# Patient Record
Sex: MALE | Race: WHITE | URBAN - METROPOLITAN AREA
[De-identification: names, ages, dates, MRNs, and addresses within clinical notes are randomized per-mention and may not be internally consistent; named-entity substitution may affect disease eponyms.]

---

## 2018-01-29 ENCOUNTER — HISTORICAL (OUTPATIENT)
Dept: RADIOLOGY | Facility: HOSPITAL | Age: 68
End: 2018-01-29

## 2018-12-06 ENCOUNTER — HOSPITAL ENCOUNTER (OUTPATIENT)
Dept: EMERGENCY MEDICINE | Facility: HOSPITAL | Age: 68
End: 2018-12-07
Attending: INTERNAL MEDICINE | Admitting: INTERNAL MEDICINE

## 2018-12-06 LAB
ABS NEUT (OLG): 9.59 X10(3)/MCL (ref 2.1–9.2)
ALBUMIN SERPL-MCNC: 2.3 GM/DL (ref 3.4–5)
ALBUMIN/GLOB SERPL: 0 RATIO (ref 1–2)
ALP SERPL-CCNC: 312 UNIT/L (ref 45–117)
ALT SERPL-CCNC: 61 UNIT/L (ref 12–78)
APTT PPP: 47.2 SECOND(S) (ref 23.3–37)
AST SERPL-CCNC: 139 UNIT/L (ref 15–37)
BASOPHILS # BLD AUTO: 0.03 X10(3)/MCL
BASOPHILS NFR BLD AUTO: 0 %
BILIRUB SERPL-MCNC: 0.8 MG/DL (ref 0.2–1)
BILIRUBIN DIRECT+TOT PNL SERPL-MCNC: 0.4 MG/DL
BILIRUBIN DIRECT+TOT PNL SERPL-MCNC: 0.4 MG/DL
BUN SERPL-MCNC: 30 MG/DL (ref 7–18)
CALCIUM SERPL-MCNC: 7.9 MG/DL (ref 8.5–10.1)
CHLORIDE SERPL-SCNC: 100 MMOL/L (ref 98–107)
CK MB SERPL-MCNC: 5.5 NG/ML (ref 1–3.6)
CK SERPL-CCNC: 2534 UNIT/L (ref 39–308)
CO2 SERPL-SCNC: 27 MMOL/L (ref 21–32)
CREAT SERPL-MCNC: 1 MG/DL (ref 0.6–1.3)
CROSSMATCH INTERPRETATION: NORMAL
EOSINOPHIL # BLD AUTO: 0.04 X10(3)/MCL
EOSINOPHIL NFR BLD AUTO: 0 %
ERYTHROCYTE [DISTWIDTH] IN BLOOD BY AUTOMATED COUNT: 14.6 % (ref 11.5–14.5)
ERYTHROCYTE [DISTWIDTH] IN BLOOD BY AUTOMATED COUNT: 14.6 % (ref 11.5–14.5)
GLOBULIN SER-MCNC: 4.8 GM/ML (ref 2.3–3.5)
GLUCOSE SERPL-MCNC: 199 MG/DL (ref 74–106)
HCT VFR BLD AUTO: 22.4 % (ref 40–51)
HCT VFR BLD AUTO: 23.6 % (ref 40–51)
HGB BLD-MCNC: 7.3 GM/DL (ref 13.5–17.5)
HGB BLD-MCNC: 7.7 GM/DL (ref 13.5–17.5)
IMM GRANULOCYTES # BLD AUTO: 0.07 10*3/UL
IMM GRANULOCYTES NFR BLD AUTO: 1 %
INR PPP: 1.84 (ref 0.9–1.2)
LACTATE SERPL-SCNC: 2.4 MMOL/L (ref 0.4–2)
LACTATE SERPL-SCNC: 4 MMOL/L (ref 0.4–2)
LYMPHOCYTES # BLD AUTO: 0.78 X10(3)/MCL
LYMPHOCYTES NFR BLD AUTO: 7 % (ref 13–40)
MAGNESIUM SERPL-MCNC: 1.9 MG/DL (ref 1.8–2.4)
MCH RBC QN AUTO: 31.9 PG (ref 26–34)
MCH RBC QN AUTO: 32.2 PG (ref 26–34)
MCHC RBC AUTO-ENTMCNC: 32.6 GM/DL (ref 31–37)
MCHC RBC AUTO-ENTMCNC: 32.6 GM/DL (ref 31–37)
MCV RBC AUTO: 97.8 FL (ref 80–100)
MCV RBC AUTO: 98.7 FL (ref 80–100)
MONOCYTES # BLD AUTO: 0.58 X10(3)/MCL
MONOCYTES NFR BLD AUTO: 5 % (ref 4–12)
NEUTROPHILS # BLD AUTO: 9.59 X10(3)/MCL
NEUTROPHILS NFR BLD AUTO: 86 X10(3)/MCL
PLATELET # BLD AUTO: 252 X10(3)/MCL (ref 130–400)
PLATELET # BLD AUTO: 254 X10(3)/MCL (ref 130–400)
PMV BLD AUTO: 9.7 FL (ref 7.4–10.4)
PMV BLD AUTO: 9.9 FL (ref 7.4–10.4)
POTASSIUM SERPL-SCNC: 4.1 MMOL/L (ref 3.5–5.1)
PRODUCT READY: NORMAL
PROT SERPL-MCNC: 7.1 GM/DL (ref 6.4–8.2)
PROTHROMBIN TIME: 20.2 SECOND(S) (ref 11.9–14.4)
RBC # BLD AUTO: 2.29 X10(6)/MCL (ref 4.5–5.9)
RBC # BLD AUTO: 2.39 X10(6)/MCL (ref 4.5–5.9)
SODIUM SERPL-SCNC: 134 MMOL/L (ref 136–145)
TRANSFUSION ORDER: NORMAL
TROPONIN I SERPL-MCNC: 0.03 NG/ML (ref 0–0.05)
WBC # SPEC AUTO: 11.1 X10(3)/MCL (ref 4.5–11)
WBC # SPEC AUTO: 13.6 X10(3)/MCL (ref 4.5–11)

## 2018-12-07 LAB
AMPHET UR QL SCN: NEGATIVE
APPEARANCE, UA: CLEAR
BACTERIA #/AREA URNS AUTO: ABNORMAL /[HPF]
BARBITURATE SCN PRESENT UR: NEGATIVE
BENZODIAZ UR QL SCN: NEGATIVE
BILIRUB UR QL STRIP: NEGATIVE
CANNABINOIDS UR QL SCN: POSITIVE
COCAINE UR QL SCN: NEGATIVE
COLOR UR: YELLOW
GLUCOSE (UA): NORMAL
HGB UR QL STRIP: NEGATIVE
HYALINE CASTS #/AREA URNS LPF: ABNORMAL /[LPF]
KETONES UR QL STRIP: ABNORMAL
LEUKOCYTE ESTERASE UR QL STRIP: NEGATIVE
NITRITE UR QL STRIP: NEGATIVE
OPIATES UR QL SCN: POSITIVE
PCP UR QL: NEGATIVE
PH UR STRIP.AUTO: 5.5 [PH] (ref 5–8)
PH UR STRIP: 5.5 [PH] (ref 4.5–8)
PROT UR QL STRIP: 30 MG/DL
RBC #/AREA URNS AUTO: ABNORMAL /[HPF]
SP GR UR STRIP: 1.02 (ref 1–1.03)
SQUAMOUS #/AREA URNS LPF: ABNORMAL /[LPF]
TEMPERATURE, URINE (OHS): 25 DEGC (ref 20–25)
UROBILINOGEN UR STRIP-ACNC: 2 MG/DL
WBC #/AREA URNS AUTO: ABNORMAL /HPF

## 2019-07-24 ENCOUNTER — HISTORICAL (OUTPATIENT)
Dept: ADMINISTRATIVE | Facility: HOSPITAL | Age: 69
End: 2019-07-24

## 2019-07-24 LAB
ALBUMIN SERPL-MCNC: 1.8 GM/DL (ref 3.4–5)
ALP SERPL-CCNC: 254 UNIT/L (ref 45–117)
ALT SERPL-CCNC: 28 UNIT/L (ref 16–61)
AST SERPL-CCNC: 50 UNIT/L (ref 15–37)
BILIRUB SERPL-MCNC: 0.5 MG/DL (ref 0.2–1)
BILIRUBIN DIRECT+TOT PNL SERPL-MCNC: 0.22 MG/DL (ref 0–0.2)
BILIRUBIN DIRECT+TOT PNL SERPL-MCNC: 0.28 MG/DL (ref 0–1)
PROT SERPL-MCNC: 6.2 GM/DL (ref 6.4–8.2)

## 2022-04-11 ENCOUNTER — HISTORICAL (OUTPATIENT)
Dept: ADMINISTRATIVE | Facility: HOSPITAL | Age: 72
End: 2022-04-11

## 2022-04-27 VITALS
WEIGHT: 163.81 LBS | DIASTOLIC BLOOD PRESSURE: 79 MMHG | HEIGHT: 74 IN | SYSTOLIC BLOOD PRESSURE: 133 MMHG | BODY MASS INDEX: 21.02 KG/M2

## 2022-04-30 NOTE — CONSULTS
"   Patient:   Saul Calzada            MRN: [id number]            FIN: [id number]               Age:   68 years     Sex:  Male     :  1950   Associated Diagnoses:   None   Author:   Renata Case MD      DATE: 2018    [facility] Internal Medicine Consult Note    History of Present Illness:  Mr. Calzada is a 60-year-old  man with PMH significant for HTN, paroxysmal AFib, CAD s/p CABG ×2 and aortic valve replacement with porcine valve on 2018, and PVD s/p femoral bypass surgery of the left leg on 2018 who presented to the [facility] ED complaining of worsening swollen left leg with severe pain and nausea for 1 week.  The patient had CABG ×2 with aortic valve replacement with a porcine bioprosthetic valve on 2018 in [facility].  Then, on 2018, patient proceeded to a femoral bypass surgery of his left leg due to severe PVD.  Patient stated that ever since that femoral bypass surgery, leg pain persisted.  The swelling had decreased, but increased significantly this morning along with pain and bruising of serosanguineous drainage.  He describes the pain as "hot coals " on his medial left thigh and knee, the site of the femoral bypass surgery.  He also admits to subjective fevers.  In the ED, the patient complained of severe pain in his left leg even with light touch.  He also described a chest pain over his sternotomy scars that began in the ED.  CMP was significant for glucose 199, , AlkPhos 312.  Lactic acid was 4.0.  CBC showed WBC 13.6 and H/H of 7.3/22.4.  UA was negative for blood, ketones, or signs of inflammation.  Uterus is UDS positive for cannabis and opiates.  Due to his low H/H, he was transfused 2 units of blood.  Venous ultrasound of left lower extremity was negative for thrombus, but noted hypoechoic fluid structures at site of surgical incision interpreted as likely postoperative seroma.  Chest XR, read by me, showed some left pleural " effusion, but otherwise no acute pulmonary processes.  Internal medicine was consulted for further management.  Allergies  NKDA    Home Medications  Amlodipine 5 mg daily  Aspirin 81 mg daily  Carvedilol 3.125 mg BID  Lisinopril 10 mg daily  Bumetanide 1 mg daily  Simvastatin 20 mg daily  Warfarin 5 mg daily    Past Medical History  HTN, paroxysmal AFib, coronary artery disease, peripheral vascular disease    Family Medical History  Noncontributory    Past Surgical History  ORIF of left leg on 10/12/2018  CABG ×2 on 11/16/2018  AVR on 11/16/2018  Left fem-pop on 11/20/2018    Social History  Alcohol use - daily beers  Tobacco use - daily cigarettes  Illicit drug use - marijuana use    Review of Systems  Constitutional: Fevers; No chills; No night sweats; No fatigue  HEENT: No headache; No acute vision changes  Cardiovascular: Chest pain; No trauma  Respiratory: No SOB; No cough; No wheezing  Gastrointestinal: No abdominal pain; No nausea; No vomiting; No diarrhea  Musculoskeletal: No weakness; Pain in the left leg  Neurological: No dizziness; Numbness along left leg  Psychiatric: No depression; No anxiety    Physical Exam  VITAL SIGNS: T 37.6, HR is 80, RR 21, SpO2 95% on room air, /65  GENERAL: Alert and Oriented to person, place, time, and event; Not in acute distress in the beginning, but in moderate acute distress after physical exam  HEENT: PERRLA; Extra-ocular motor grossly intact; Normal conjunctiva  CARDIOVASCULAR: Regular rate and rhythm; No mumurs, rubs, or gallops; Longitudinal midsternal surgical scar  RESPIRATORY: Clear to auscultation bilaterally; No rhonchi, rales, or wheezing  GASTROINTESTINAL: Soft; Not tender; Not distended, not tender; BS+; No guarding  MUSKULOSKELETAL: Normal ROM except as noted; Normal muscular development  EXTREMETIES: Several longitudinal incision scars on his left leg and right leg likely secondary to surgery.  There is a long surgical scar from mid medial thigh down to  just past his left knee that has dried blood along the surgical incisions.  The left leg is swollen with erythema.  Distal pedal pulses were nonpalpable, but detectable by Doppler.  During the exam, patient appears to be exquisite pain even on light touch.  NEURO: CN II-XII grossly intact; No focal neurological deficits  PSYCHIATRIC: Cooperative; Appropriate mood and affect      Assessment & Plan  Postsurgical infection vs. Compartment syndrome vs. Arterial occlusion secondary to graft failure  Hypertension  Paroxysmal AFib  CAD s/p CABG ×2  Aortic stenosis s/p AVR with porcine bioprosthetic valve  PVD s/p fem-pop of left leg    Mr. Calzada is a 68-year-old  man with significant cardiac history and PVD s/p fem-pop on 11/28/2018 who presented to the [facility] ED complaining of worsening left leg pain post surgery with swelling, numbness, and serosanguineous drainage from surgical site.  Patient left leg appears very edematous, erythematous.  There is dried blood along surgical incision lines, but no purulent drainage.  He also has paresthesias of the left leg.  After physical examination, patient appeared to have exquisite pain in the left leg out of proportion of wound appearance.  Surgery was consulted out of concern for post surgical site infection versus compartment syndrome.  Dr. Betancourt from surgery evaluated the patient and expressed concerns for arterial occlusion requiring emergent vascular surgical intervention.  Since the patient had his fem-pop surgery performed at the [facility], she recommended immediate transfer for emergent surgical intervention.  The nurse manager in the ED called the [facility] for transfer.  [facility] past surgery a septic patient.  Surgery arranging for transport, and patient will be transferred.

## 2022-04-30 NOTE — ED PROVIDER NOTES
Patient:   Saul Calzada            MRN: [id number]            FIN: [id number]               Age:   68 years     Sex:  Male     :  1950   Associated Diagnoses:   Lactic acidosis; Anemia; Postoperative pain; Intractable pain   Author:   Cinthia JACOME, Yaya MEJÍA      Basic Information   Time seen: Date 2018, Immediately upon arrival.   History source: Patient.   Arrival mode: Private vehicle, walking.   History limitation: None.   Additional information: Chief Complaint from Nursing Triage Note : Chief Complaint   2018 18:28 CST      Chief Complaint           pt c/o chest incisional pain and left leg pain. recent CABG and left popiteal bypass. chest incision healing. left leg red, swollen, and warm  .      History of Present Illness   The patient presents with lower extremity pain, Patient comes us with 2 major complaints to go on the complains of chest pain.  #2 complains of left leg pain.  Patient recently had a CABG.  This was performed at [facility].  Patient has had postoperative pain since.  Patient was seen on 12/3/18 and outside ER.  Patient diagnosed postoperative pain.  Patient reported that he had his pain control prior to his disposition.  He is prescribed Percocet ×30 tabs.  States that once he went home the Percocet never work.  He complained of taking it as recommended.  Comes our facility hoping for different so.  Patient describes his leg is hurting from hip to toes diffusely.  The pain is present is worse around the incisional site.  The incision has not drained.  He denies any fevers or chills.  His chest pain is reproducible with deep inspiration, motion, palpation.  There is no drainage or bleeding from incisional site.  Patient denies any shortness of breath.  He is chest pain is described as sharp in nature with no radiation.  Denies any shortness of breath, palpitations, near syncope, diaphoresis. , After his pain was initially treated.  His left leg pain decreased.  He  stated that once his leg pain decreased he was able to fill his chest pain more.  He describes chest pains to be pressure-like. and Once labs are starting to return patient was noted to be anemic.  On further questioning patient stated that he had had blood per rectum.  He initially stated he has had dark black stools over the last 1 week..  The onset was 3  days ago.  The course/duration of symptoms is constant and worsening.  Type of injury: Postsurgical.  Location: Left leg And sternotomy site. The character of symptoms is pain and swelling.  The degree at present is severe.  There are exacerbating factors including movement, transfer, weight bearing, walking and Palpation.  The relieving factor is none.  Risk factors consist of Patient has coronary artery disease with peripheral vascular disease and hypertension.  Patient's postoperative as well..  Prior episodes: Since surgery, yes.  prior to that, no.  Therapy today: prescription medications including Patient tried Percocet 5/325 every 3 hours with no relief of pain, this is according to his reports..  Associated symptoms: chest pain.        Review of Systems   Constitutional symptoms:  No fever, no chills, no sweats.    Skin symptoms:  No rash, no lesion.    Eye symptoms:  Vision unchanged.   Respiratory symptoms:  No shortness of breath, no cough, no hemoptysis, no sputum production, no wheezing.    Cardiovascular symptoms:  Chest pain, anterior, central, sharp, stabbing, Sternal pain, no palpitations, no tachycardia, no syncope, no diaphoresis, no peripheral edema.    Gastrointestinal symptoms:  Rectal bleeding, no abdominal pain, no nausea, no vomiting, no diarrhea, no constipation, no rectal pain.    Genitourinary symptoms:  No dysuria,    Musculoskeletal symptoms:  Muscle pain, no back pain, no Joint pain.    Neurologic symptoms:  No headache, no dizziness, no altered level of consciousness, no numbness, no tingling, no weakness.    Hematologic/Lymphatic  symptoms:  Patient is on blood thinners, bleeding tendency negative, bruising tendency negative, no swollen nodes.       Health Status   Allergies:    Allergic Reactions (Selected)  No Known Medication Allergies,    Allergies (1) Active Reaction  No Known Medication Allergies None Documented.   Medications:  (Selected)   Inpatient Medications  Ordered  NS (0.9% Sodium Chloride) Infusion 1,000 mL: 1,000 mL, 1,000 mL, IV, 1,000 mL/hr, start date 12/06/18 19:58:00 CST  morphine 2 mg/mL injectable solution: 2 mg, form: Soln, IV, q3hr PRN for pain, severe, first dose 12/06/18 21:19:00 CST, STAT  Prescriptions  Prescribed  Coreg 3.125 mg oral tablet: 12.5 mg = 4 tab(s), Oral, BIDWMeal, # 240 tab(s), 0 Refill(s)  Lovenox 80 mg/0.8 mL subcutaneous solution: 80 mg, Subcutaneous, q12hr, # 1 box(es), 0 Refill(s), other reason (Rx)  Percocet 5/325 oral tablet: 1 tab(s), Oral, q4hr, PRN PRN for pain, X 5 day(s), # 30 tab(s), 0 Refill(s)  aspirin 81 mg oral tablet, CHEWABLE: 81 mg = 1 tab(s), Oral, Daily, X 30 day(s), # 30 tab(s), 2 Refill(s)  bumetanide 1 mg oral tablet: 1 mg = 1 tab(s), Oral, Daily, # 30 tab(s), 0 Refill(s)  simvastatin 20 mg oral tablet: 20 mg = 1 tab(s), Oral, Daily, # 30 tab(s), 0 Refill(s)  Documented Medications  Documented  Coumadin 5 mg oral tablet: 5 mg = 1 tab(s), Oral, Daily, 0 Refill(s)  Norvasc 5 mg oral tablet: 10 mg = 2 tab(s), Oral, Daily, 0 Refill(s)  Vitamin D3 50,000 intl units oral capsule: 50,000 IntUnit = 1 cap(s), Oral, qWeek, TAKES EVERY TUESDAY, # 12 cap(s), 0 Refill(s)  gabapentin 300 mg oral capsule: 300 mg = 1 cap(s), Oral, BID, 0 Refill(s)  lisinopril 5 mg oral tablet: 20 mg = 4 tab(s), Oral, BID, 0 Refill(s)  nicotine 21 mg/24 hr transdermal film, extended release: TD, Daily, 0 Refill(s).      Past Medical/ Family/ Social History   Medical history:    Resolved  PVD-peripheral vascular disease ([id number]):  Resolved..   Surgical history:    Bypass Femoral Popliteal (Left) on  11/27/2018 at 68 Years.  Comments:  11/28/2018 00:47 - Wilberto DUNCAN, Bradley REID  auto-populated from documented surgical case  Bypass CABG, AVR (.) on 11/15/2018 at 68 Years.  Comments:  11/15/2018 16:50 - Elzbieta DUNCAN, Ivelisse Melendez  auto-populated from documented surgical case  ORIF left leg..   Family history:    No family history items have been selected or recorded..   Social history:    Social & Psychosocial Habits    Alcohol  01/15/2018  Use: Past    10/12/2018  Use: Current    Frequency: Daily    11/08/2018  Use: Current    Type: Beer    Frequency: Daily    Substance Abuse  01/15/2018  Use: Current    Type: Marijuana    11/08/2018  Use: Current    Type: Marijuana    Tobacco  01/15/2018  Use: Current every day smoker    Type: Cigarettes    11/08/2018  Use: Current every day smoker    Type: Cigarettes    Patient Wants Consult For Cessation Counseling N/A    11/08/2018  Use: Current every day smoker    Type: Cigarettes    Patient Wants Consult For Cessation Counseling No    12/06/2018  Use: Current every day smoker    Patient Wants Consult For Cessation Counseling No.   Problem list:    Active Problems (2)  HTN - Hypertension   Tobacco user .      Physical Examination               Vital Signs   Vital Signs   12/6/2018 22:26 CST      Temperature Axillary      37.0 DegC  HI                             Temperature Axillary (calculated)         98.60 DegF                             Heart Rate Monitored      81                             Respiratory Rate          22                             SpO2                      96 %                             Systolic Blood Pressure   136                             Diastolic Blood Pressure  55  LOW    12/6/2018 22:08 CST      Temperature Oral          37.3 DegC  HI                             Temperature Oral (calculated)             99.14 DegF                             Heart Rate Monitored      80                             Respiratory Rate          22                              SpO2                      95 %                             Systolic Blood Pressure   125                             Diastolic Blood Pressure  50  LOW    12/6/2018 22:00 CST      Peripheral Pulse Rate     77 bpm                             Heart Rate Monitored      85 bpm                             Respiratory Rate          23 br/min                             SpO2                      90 %  LOW                             Systolic Blood Pressure   125 mmHg                             Diastolic Blood Pressure  50 mmHg  LOW                             Mean Arterial Pressure, Cuff              75 mmHg    12/6/2018 21:47 CST      Temperature Temporal Artery               37.3 DegC    12/6/2018 21:30 CST      Peripheral Pulse Rate     83 bpm                             Heart Rate Monitored      82 bpm                             Respiratory Rate          26 br/min  HI                             SpO2                      99 %                             Systolic Blood Pressure   149 mmHg  HI                             Diastolic Blood Pressure  62 mmHg                             Mean Arterial Pressure, Cuff              91 mmHg    12/6/2018 21:00 CST      Peripheral Pulse Rate     80 bpm                             Heart Rate Monitored      80 bpm                             Respiratory Rate          21 br/min                             SpO2                      95 %                             Systolic Blood Pressure   140 mmHg                             Diastolic Blood Pressure  65 mmHg                             Mean Arterial Pressure, Cuff              90 mmHg    12/6/2018 20:25 CST      SpO2                      99 %    12/6/2018 19:50 CST      Peripheral Pulse Rate     129 bpm  HI  (Modified)                            Heart Rate Monitored      95 bpm                             Respiratory Rate          22 br/min  (Modified)                            SpO2                      98 %                              Systolic Blood Pressure   132 mmHg                             Diastolic Blood Pressure  67 mmHg                             Mean Arterial Pressure, Cuff              89 mmHg    12/6/2018 18:28 CST      Temperature Oral          37.6 DegC                             Temperature Oral (calculated)             99.68 DegF                             Peripheral Pulse Rate     82 bpm                             Respiratory Rate          22 br/min                             SpO2                      98 %                             Oxygen Therapy            Room air                             Systolic Blood Pressure   165 mmHg  HI                             Diastolic Blood Pressure  69 mmHg  .      Vital Signs (last 24 hrs)_____  Last Charted___________  Temp Axillary     H 37.0DegC  (DEC 06 22:26)  Heart Rate Peripheral   77 bpm  (DEC 06 22:00)  Resp Rate         22   (DEC 06 22:26)  SBP      136   (DEC 06 22:26)  DBP      L 55  (DEC 06 22:26)  SpO2      96 %  (DEC 06 22:26)  Weight      95.1 kg  (DEC 06 18:28).   Measurements   12/6/2018 18:28 CST      Weight Dosing             95.1 kg                             Weight Measured           95.1 kg                             Weight Measured and Calculated in Lbs     209.66 lb  .   Basic Oxygen Information   12/6/2018 22:26 CST      SpO2                      96 %    12/6/2018 22:08 CST      SpO2                      95 %    12/6/2018 22:00 CST      SpO2                      90 %  LOW    12/6/2018 21:30 CST      SpO2                      99 %    12/6/2018 21:00 CST      SpO2                      95 %    12/6/2018 20:25 CST      SpO2                      99 %    12/6/2018 19:50 CST      SpO2                      98 %    12/6/2018 18:28 CST      SpO2                      98 %                             Oxygen Therapy            Room air  .   General:  Alert, no acute distress.    Skin:  Warm, intact, moist, no pallor, no rash, normal for ethnicity, Not  cyanotic,    Head:  Normocephalic, atraumatic.    Neck:  Supple, trachea midline, no tenderness, no JVD, no carotid bruit.    Eye:  Extraocular movements are intact, normal conjunctiva.    Cardiovascular:  Regular rate and rhythm, No murmur, Normal peripheral perfusion, No edema.    Respiratory:  Lungs are clear to auscultation, respirations are non-labored, breath sounds are equal, Symmetrical chest wall expansion, Clubbing of nails: None.    Chest wall:  No deformity, Patient has tenderness to his anterior chest.  Pain is made worse with palpation close to the sternotomy.  There is no crepitus noted.  No deformities noted.  No ecchymosis.  No drainage or bleeding from the sternotomy site..    Back:  Nontender, Normal range of motion, Normal alignment.    Musculoskeletal:  Normal ROM, normal strength, no deformity, Tenderness is circumferentially from ankle up to mid thigh by patient's reports.  On palpation he expresses the same.  Keri-incisional areas most tender.  Incision does not appear to be draining any fluid, purulence or blood., Lower extremity: Left, thigh, calf, tenderness, swelling.    Gastrointestinal:  Soft, Nontender, Non distended, Normal bowel sounds, No organomegaly.    Genitourinary:  No tenderness (no falnk tenderness), no discharge.    Neurological:  Alert and oriented to person, place, time, and situation, No focal neurological deficit observed, CN II-XII intact, normal sensory observed, normal motor observed, normal speech observed, normal coordination observed.    Lymphatics:  No lymphadenopathy.   Psychiatric:  Cooperative, appropriate mood & affect, normal judgment.       Medical Decision Making   Differential Diagnosis:  Cellulitis, Vascular insufficiency, edema, infection, postoperative pain.    Documents reviewed:  Emergency department nurses' notes.   Results review:  Lab results : Lab View   12/6/2018 21:30 CST      WBC                       13.6 x10(3)/mcL  HI                              RBC                       2.29 x10(6)/mcL  LOW                             Hgb                       7.3 gm/dL  LOW                             Hct                       22.4 %  LOW                             Platelet                  254 x10(3)/mcL                             MCV                       97.8 fL                             MCH                       31.9 pg                             MCHC                      32.6 gm/dL                             RDW                       14.6 %  HI                             MPV                       9.7 fL    12/6/2018 20:21 CST      Magnesium Lvl             1.9 mg/dL                             Total CK                  2,534 unit/L  HI                             CK MB                     5.5 ng/mL  HI                             Troponin-I                0.030 ng/mL                             PT                        20.2 second(s)  HI                             INR                       1.84  HI                             PTT                       47.2 second(s)  HI    12/6/2018 20:05 CST      ABORh Auto Intp           A NEG                             ABSC Auto Intrp           Neg                             Crossmatch                Compatible                             Crossmatch                Compatible                             Product Ready             2 LPC READY    12/6/2018 18:35 CST      Lactic Acid Lvl           4.0 mmol/L  CRIT    12/6/2018 18:30 CST      Sodium Lvl                134 mmol/L  LOW                             Potassium Lvl             4.1 mmol/L                             Chloride                  100 mmol/L                             CO2                       27 mmol/L                             Calcium Lvl               7.9 mg/dL  LOW                             Glucose Lvl               199 mg/dL  HI                             BUN                       30 mg/dL  HI                             Creatinine                 1.00 mg/dL                             eGFR-AA                   96 mL/min                             eGFR-GALLO                  79 mL/min  LOW                             Bili Total                0.8 mg/dL                             Bili Direct               0.4 mg/dL  HI                             Bili Indirect             0.4 mg/dL                             AST                       139 unit/L  HI                             ALT                       61 unit/L                             Alk Phos                  312 unit/L  HI                             Total Protein             7.1 gm/dL                             Albumin Lvl               2.3 gm/dL  LOW                             Globulin                  4.80 gm/mL  HI                             A/G Ratio                 0 ratio  LOW                             WBC                       11.1 x10(3)/mcL  HI                             RBC                       2.39 x10(6)/mcL  LOW                             Hgb                       7.7 gm/dL  LOW                             Hct                       23.6 %  LOW                             Platelet                  252 x10(3)/mcL                             MCV                       98.7 fL                             MCH                       32.2 pg                             MCHC                      32.6 gm/dL                             RDW                       14.6 %  HI                             MPV                       9.9 fL                             Abs Neut                  9.59 x10(3)/mcL  HI                             Neutro Auto               86 x10(3)/mcL  NA                             Lymph Auto                7 %  LOW                             Mono Auto                 5 %                             Eos Auto                  0  NA                             Abs Eos                   0.04 x10(3)/mcL  NA                             Basophil Auto             0  NA                              Abs Neutro                9.59 x10(3)/mcL  NA                             Abs Lymph                 0.78 x10(3)/mcL  NA                             Abs Mono                  0.58 x10(3)/mcL  NA                             Abs Baso                  0.03 x10(3)/mcL  NA                             IG%                       1 %  NA                             IG#                       0.0700  NA  ,    Labs (Last four charted values)  WBC                  H 13.6 (DEC 06) H 11.1 (DEC 06)   Hgb                  L 7.3 (DEC 06) L 7.7 (DEC 06)   Hct                  L 22.4 (DEC 06) L 23.6 (DEC 06)   Plt                  254 (DEC 06) 252 (DEC 06)   Na                   L 134 (DEC 06)   K                    4.1 (DEC 06)   CO2                  27 (DEC 06)   Cl                   100 (DEC 06)   Cr                   1.00 (DEC 06)   BUN                  H 30 (DEC 06)   Glucose Random       H 199 (DEC 06)   PT                   H 20.2 (DEC 06)   INR                  H 1.84 (DEC 06)   PTT                  H 47.2 (DEC 06) .    Radiology results:  Rad Results (ST)  < 12 hrs   Accession: [id number]  Order: US Venous Lower Extremity Left  Report Dt/Tm: 12/06/2018 19:57  Report:   History  Leg swelling     Reference Study  None available.     Findings  Sonographic images with color and spectral analysis were obtained of  the visualized proximal right and lower left extremity venous system.  The imaged lower extremity veins demonstrate normal flow,  compressibility, and augmentation without evidence of thrombus.     In the regions of surgical incision in the groin region hypoechoic  fluid structures are noted without internal vascular flow or internal  complexity. Could represent postoperative seroma. Likely reactive  lymphadenopathy.     Impression  1. Negative exam for left extremity thrombus.  2. Likely postoperative seroma.    .      Reexamination/ Reevaluation   Time: 12/6/2018 22:45:00 .   Vital signs   Basic  Oxygen Information   12/6/2018 22:26 CST      SpO2                      96 %    12/6/2018 22:08 CST      SpO2                      95 %    12/6/2018 22:00 CST      SpO2                      90 %  LOW    12/6/2018 21:30 CST      SpO2                      99 %    12/6/2018 21:00 CST      SpO2                      95 %    12/6/2018 20:25 CST      SpO2                      99 %    12/6/2018 19:50 CST      SpO2                      98 %    12/6/2018 18:28 CST      SpO2                      98 %                             Oxygen Therapy            Room air     Course: improving.   Pain status: decreased.   Assessment: exam improved.      Impression and Plan   Diagnosis   Lactic acidosis (TAB03-QO E87.2)   Anemia (OLV21-PM D64.9)   Postoperative pain (BSK35-FO G89.18)   Intractable pain (JRT68-MQ R52)   LLE postoperative seroma      Calls-Consults   -  12/6/2018 21:10:00 , Shaik AYAZ, Lauren MEJÍA , phone call, consult, recommends will admit..

## 2022-04-30 NOTE — ED PROVIDER NOTES
"   Patient:   Saul Calzada            MRN: [id number]            FIN: [id number]               Age:   68 years     Sex:  Male     :  1950   Associated Diagnoses:   None   Author:   Ethan De Dios MD      18 @ 8560 - Called to the bedside to evalate patient.  Nurse reports patient was standing, and "turned wrong" and now has dehiscence of left leg wound.  Physical exam Left leg - dehiscence of left medial leg incision site from recent surgery.  Depth - subcutaneous.  Mild oozing of venous blood.  Patient placed in bed and sterile 4x4's applied to wound.  18 @ 0205 - General Surgery on Call (Dr. Betancourt) contacted.  Will come to evaluate the patient.  18@ 0000 - Medicine called and informed of situation with patient.  Will come down to evaluate.   "

## 2022-05-04 NOTE — HISTORICAL [facility] CERNER
This is a historical note converted from [place]. Formatting and pictures may have been removed.  Please reference [place] for original formatting and attached multimedia. Chief Complaint  pt c/o chest incisional pain and left leg pain. recent CABG and left popiteal bypass. chest incision healing. left leg red, swollen, and warm  Reason for Consultation  ?Evaluate wound complication post fem  ?  History of Present Illness  68M s/p recent CABG (11/15) and s/p Left femoral endarterectomy and bovine patch angioplasty, harvest of left GSV, left AK pop-BK pop bypass with reversed GSV on 11/27 who presents with left hip pain , left lower extremity paresthesia, swelling, and skin separation at his medial incision.  Physical Exam  Vitals & Measurements  T:?37.3? ?C (Oral)? TMIN:?37.0? ?C (Axillary)? TMAX:?37.6? ?C (Oral)? HR:?80(Monitored)? RR:?19? BP:?157/62? SpO2:?98%? WT:?97.4?kg? WT:?97.4?kg?  Patient appears uncomfortable  Midline CABG scar well healed  B/l femoral pulses 2+  ?  Left lower extremity:  DP and PT pulses non palpable, good doppler signals  Swelling of left calf, Calf appears pale  Sensation intact  Able to wiggle left toes, cannot lift leg off bed  Medial incision open with exposed subcutaneous tissue  Assessment/Plan  Anemia?D64.9  Intractable pain?R52  Lactic acidosis?E87.2  Leg pain-swelling?[id number]  Postoperative pain?G89.18  68M s/p recent fem pop presents?with concern for arterial occlusion requiring intervention  Patient with lactic acidosis, leukocytosis  Venous system patent on ultrasound;  - Recommend urgent transfer for vascular surgical evaluation;  ?  CARMEL Betancourt MD (PGY3)   Problem List/Past Medical History  Ongoing  HTN - Hypertension  Tobacco user  Historical  PVD-peripheral vascular disease  Procedure/Surgical History  Bypass Femoral Popliteal (Left) (11/27/2018)  Bypass CABG, AVR (.) (11/15/2018)  ORIF left leg   Medications  Inpatient  aspirin 81 mg oral tablet,  CHEWABLE, 81 mg= 1 tab(s), Oral, Daily  bumetanide 1 mg oral tablet, 1 mg= 1 tab(s), Oral, Daily  carvedilol 12.5 mg oral tablet, 12.5 mg= 1 tab(s), Oral, BIDWMeal  Cholecalciferol (Vitamin D3) oral capsule, 50,000 intl unit(s)/1 cap(s), Oral, qTuesday  Coumadin, 5 mg= 1 tab(s), Oral, Daily  gabapentin 300 mg oral capsule, 300 mg= 1 cap(s), Oral, BID  IVF Lactated Ringers LR Infusion 1,000 mL, 1000 mL, IV  lisinopril 10 mg oral tablet, 20 mg= 2 tab(s), Oral, BID  morphine 2 mg/mL injectable solution, 2 mg= 1 mL, IV, q3hr, PRN  nicotine 21 mg/24 hr transdermal film, extended release, 21 mg/24hr, TD, Daily  Norvasc 5 mg oral tablet, 10 mg= 2 tab(s), Oral, Daily  NS (0.9% Sodium Chloride) Infusion 1,000 mL, 1000 mL, IV  simvastatin 20 mg oral tablet, 20 mg= 1 tab(s), Oral, Daily  vancomycin  Vancomycin (for IVPB)  Zosyn, 3.375 gm= 50 mL, IV Piggyback, q8hr  Home  aspirin 81 mg oral tablet, CHEWABLE, 81 mg= 1 tab(s), Oral, Daily, 2 refills  bumetanide 1 mg oral tablet, 1 mg= 1 tab(s), Oral, Daily  Coreg 3.125 mg oral tablet, 12.5 mg= 4 tab(s), Oral, BIDWMeal  Coumadin 5 mg oral tablet, 5 mg= 1 tab(s), Oral, Daily  gabapentin 300 mg oral capsule, 300 mg= 1 cap(s), Oral, BID  lisinopril 5 mg oral tablet, 20 mg= 4 tab(s), Oral, BID  Lovenox 80 mg/0.8 mL subcutaneous solution, 80 mg, Subcutaneous, q12hr  nicotine 21 mg/24 hr transdermal film, extended release, TD, Daily  Norvasc 5 mg oral tablet, 10 mg= 2 tab(s), Oral, Daily  simvastatin 20 mg oral tablet, 20 mg= 1 tab(s), Oral, Daily,? ?Not taking  Vitamin D3 50,000 intl units oral capsule, 02351 IntUnit= 1 cap(s), Oral, qWeek  Allergies  No Known Medication Allergies  Social History  Alcohol  Current, Beer, Daily, 11/08/2018  Current, Daily, 10/12/2018  Past, 01/15/2018  Substance Abuse  Current, Marijuana, 11/08/2018  Current, Marijuana, 01/15/2018  Tobacco  Current every day smoker, No, 12/06/2018  Current every day smoker, Cigarettes, No, 11/08/2018  Current every day  smoker, Cigarettes, N/A, 11/08/2018  Current every day smoker, Cigarettes, 01/15/2018  Immunizations  Vaccine Date Status Comments   influenza virus vaccine, inactivated 11/09/2018 Given Early/Late Reason:  New Med Order